# Patient Record
Sex: MALE | Race: WHITE | NOT HISPANIC OR LATINO
[De-identification: names, ages, dates, MRNs, and addresses within clinical notes are randomized per-mention and may not be internally consistent; named-entity substitution may affect disease eponyms.]

---

## 2019-04-20 PROBLEM — Z00.00 ENCOUNTER FOR PREVENTIVE HEALTH EXAMINATION: Status: ACTIVE | Noted: 2019-04-20

## 2019-05-20 ENCOUNTER — APPOINTMENT (OUTPATIENT)
Dept: UROLOGY | Facility: CLINIC | Age: 34
End: 2019-05-20
Payer: COMMERCIAL

## 2019-05-20 VITALS
HEART RATE: 82 BPM | SYSTOLIC BLOOD PRESSURE: 128 MMHG | WEIGHT: 135 LBS | HEIGHT: 68 IN | BODY MASS INDEX: 20.46 KG/M2 | DIASTOLIC BLOOD PRESSURE: 92 MMHG

## 2019-05-20 DIAGNOSIS — Z87.898 PERSONAL HISTORY OF OTHER SPECIFIED CONDITIONS: ICD-10-CM

## 2019-05-20 DIAGNOSIS — F17.210 NICOTINE DEPENDENCE, CIGARETTES, UNCOMPLICATED: ICD-10-CM

## 2019-05-20 DIAGNOSIS — Z78.9 OTHER SPECIFIED HEALTH STATUS: ICD-10-CM

## 2019-05-20 DIAGNOSIS — R10.30 LOWER ABDOMINAL PAIN, UNSPECIFIED: ICD-10-CM

## 2019-05-20 PROCEDURE — 99202 OFFICE O/P NEW SF 15 MIN: CPT

## 2019-05-20 NOTE — ASSESSMENT
[FreeTextEntry1] : His physical examination is within normal limits. His bladder sonogram reveals elevated postvoid residual however, it is likely that he overdistended himself. His physical examination does not reveal prostatitis. He will keep a voiding diary and followup for possible uroflow study.

## 2019-05-20 NOTE — HISTORY OF PRESENT ILLNESS
[Currently Experiencing ___] :  [unfilled] [Straining] : straining [Weak Stream] : weak stream [Intermittency] : intermittency [FreeTextEntry1] : Thiago is a 33-year-old male who was sent for consultation regarding decreased force of urinary stream with urinary hesitancy and intermittent testicular discomfort radiating to his rectum. These symptoms have been intermittent over the past 6 months. He presented to the PCP who ordered a bladder/scrotal ultrasound.\par \par Bladder sonogram reveals pre void volume of 870 and a post void of 290 cc. Prostate by sono is 27 cc and minimally enlarged. He admits that he may have over distended for the study and usually voids better then he did then.\par \par Scrotal ultrasound is essentially within normal limits.\par \par He denies any further irritated/obstructive voiding symptoms, gross hematuria, dysuria, foul-smelling urine, ejaculodynia, abdominal/flank pain, nausea, vomiting, chills, fever, lightheadedness, dizziness,  or further urological/constitutional symptoms.\par

## 2019-05-20 NOTE — PHYSICAL EXAM
[General Appearance - Well Developed] : well developed [General Appearance - Well Nourished] : well nourished [Normal Appearance] : normal appearance [Well Groomed] : well groomed [General Appearance - In No Acute Distress] : no acute distress [Edema] : no peripheral edema [Respiration, Rhythm And Depth] : normal respiratory rhythm and effort [Exaggerated Use Of Accessory Muscles For Inspiration] : no accessory muscle use [Abdomen Soft] : soft [Abdomen Tenderness] : non-tender [Costovertebral Angle Tenderness] : no ~M costovertebral angle tenderness [Normal Station and Gait] : the gait and station were normal for the patient's age [] : no rash [No Focal Deficits] : no focal deficits [Oriented To Time, Place, And Person] : oriented to person, place, and time [Affect] : the affect was normal [Mood] : the mood was normal [Not Anxious] : not anxious [Urethral Meatus] : meatus normal [Penis Abnormality] : normal circumcised penis [Urinary Bladder Findings] : the bladder was normal on palpation [Scrotum] : the scrotum was normal [Epididymis] : the epididymides were normal [Testes Tenderness] : no tenderness of the testes [Anus Abnormality] : the anus and perineum were normal [Rectal Exam - Rectum] : digital rectal exam was normal [Prostate Enlargement] : the prostate was not enlarged [Prostate Tenderness] : the prostate was not tender [No Prostate Nodules] : no prostate nodules [Prostate Size ___ gm] : prostate size [unfilled] gm [Femoral Lymph Nodes Enlarged Bilaterally] : femoral [Inguinal Lymph Nodes Enlarged Bilaterally] : inguinal [Abdomen Hernia] : no hernia was discovered [FreeTextEntry1] : Digital rectal exam reveals 25 g prostate smooth, symmetrical, without fluctuations, pain, or nodules, no evidence of prostatitis. massage did NOT yield EPS

## 2019-05-20 NOTE — LETTER HEADER
[FreeTextEntry3] : Frank Schilling M.D.\par Director of Urology\par [facility]\par [address] [place] [address], [address]\[place] [address]

## 2019-05-20 NOTE — LETTER BODY
[Dear  ___] : Dear  [unfilled], [Please see my note below.] : Please see my note below. [Sincerely,] : Sincerely, [Consult Letter:] : I had the pleasure of evaluating your patient, [unfilled]. [Consult Closing:] : Thank you very much for allowing me to participate in the care of this patient.  If you have any questions, please do not hesitate to contact me. [FreeTextEntry2] : Dr. Yeoman Chan\par [address]\[place] [address]\par

## 2019-05-24 ENCOUNTER — TRANSCRIPTION ENCOUNTER (OUTPATIENT)
Age: 34
End: 2019-05-24

## 2019-07-10 ENCOUNTER — APPOINTMENT (OUTPATIENT)
Dept: UROLOGY | Facility: CLINIC | Age: 34
End: 2019-07-10
Payer: COMMERCIAL

## 2019-07-10 VITALS
HEART RATE: 82 BPM | WEIGHT: 135 LBS | BODY MASS INDEX: 20.46 KG/M2 | DIASTOLIC BLOOD PRESSURE: 92 MMHG | HEIGHT: 68 IN | SYSTOLIC BLOOD PRESSURE: 128 MMHG

## 2019-07-10 DIAGNOSIS — R33.9 RETENTION OF URINE, UNSPECIFIED: ICD-10-CM

## 2019-07-10 DIAGNOSIS — R39.12 POOR URINARY STREAM: ICD-10-CM

## 2019-07-10 PROCEDURE — 99213 OFFICE O/P EST LOW 20 MIN: CPT | Mod: 25

## 2019-07-10 PROCEDURE — 51798 US URINE CAPACITY MEASURE: CPT

## 2019-07-10 PROCEDURE — 99406 BEHAV CHNG SMOKING 3-10 MIN: CPT

## 2019-07-10 PROCEDURE — 51741 ELECTRO-UROFLOWMETRY FIRST: CPT

## 2019-07-10 RX ORDER — ALFUZOSIN HYDROCHLORIDE 10 MG/1
10 TABLET, EXTENDED RELEASE ORAL DAILY
Qty: 30 | Refills: 3 | Status: ACTIVE | COMMUNITY
Start: 2019-07-10 | End: 1900-01-01

## 2019-07-10 NOTE — PHYSICAL EXAM
[General Appearance - Well Developed] : well developed [General Appearance - Well Nourished] : well nourished [Normal Appearance] : normal appearance [Well Groomed] : well groomed [General Appearance - In No Acute Distress] : no acute distress [Edema] : no peripheral edema [Respiration, Rhythm And Depth] : normal respiratory rhythm and effort [] : no respiratory distress [Oriented To Time, Place, And Person] : oriented to person, place, and time [Exaggerated Use Of Accessory Muscles For Inspiration] : no accessory muscle use [Affect] : the affect was normal [Mood] : the mood was normal [Not Anxious] : not anxious [Normal Station and Gait] : the gait and station were normal for the patient's age [No Focal Deficits] : no focal deficits

## 2019-07-10 NOTE — LETTER HEADER
[FreeTextEntry3] : Frank Schilling M.D.\par Director of Urology\par [facility]\par [address] [place] [address], [address]\[place] [address]

## 2019-07-10 NOTE — ASSESSMENT
[FreeTextEntry1] : He is experiencing decreased force of stream and is pushing/straining to void with an elevated post void residual. At this point, we will start him on alfuzosin p.o. q. daily. All usage, dosage, mechanism of action, and adverse events were reviewed. We also discussed Flomax with its decreased force of ejaculation which might help his painful ejaculation versus alfuzosin which can sometimes cause orthostatic hypotension and he chose the alfuzosin. He'll keep a voiding diary in 6 weeks and follow up in 8 weeks for possible flow study and symptom index.\par \par Please note we discussed his smoking which he enjoys but we also discussed the effect it has on once future sexual activity and whether or not smoking might kill him both lung cancer or heart attack of greater concern to him as he might live with impotence. We discussed various methods of stopping and he will try the tapir method going down one cigarette per day limiting himself in the first week to 20 cigarettes. This might take four months but he may be owed to get off it.\par

## 2019-07-10 NOTE — LETTER BODY
DISPLAY PLAN FREE TEXT [Dear  ___] : Dear  [unfilled], [Consult Letter:] : I had the pleasure of evaluating your patient, [unfilled]. [Please see my note below.] : Please see my note below. [Consult Closing:] : Thank you very much for allowing me to participate in the care of this patient.  If you have any questions, please do not hesitate to contact me. [Sincerely,] : Sincerely, [FreeTextEntry2] : Dr. Yeoman Chan\par [address]\[place] [address]\par

## 2019-07-10 NOTE — HISTORY OF PRESENT ILLNESS
[Currently Experiencing ___] :  [unfilled] [Straining] : straining [Weak Stream] : weak stream [Intermittency] : intermittency [FreeTextEntry1] : Thiago is a 33-year-old male who was sent for consultation regarding decreased force of urinary stream with urinary hesitancy and intermittent testicular discomfort radiating to his rectum. These symptoms have been intermittent over the past 6 months. Prior bladder sonogram reveals pre void volume of 870 and a post void of 290 cc. Prostate by trans abdominal sonogram is 27 cc and moderately enlarged. Scrotal ultrasound is essentially within normal limits.\par Physical examination revealed no evidence of prostatitis. Presents today to review his voiding diary and possibly undergo a uroflow study.

## 2019-09-04 ENCOUNTER — APPOINTMENT (OUTPATIENT)
Dept: UROLOGY | Facility: CLINIC | Age: 34
End: 2019-09-04

## 2019-10-02 ENCOUNTER — APPOINTMENT (OUTPATIENT)
Dept: UROLOGY | Facility: CLINIC | Age: 34
End: 2019-10-02

## 2021-08-06 ENCOUNTER — EMERGENCY (EMERGENCY)
Facility: HOSPITAL | Age: 36
LOS: 0 days | Discharge: HOME | End: 2021-08-06
Attending: EMERGENCY MEDICINE | Admitting: EMERGENCY MEDICINE
Payer: MEDICAID

## 2021-08-06 VITALS
SYSTOLIC BLOOD PRESSURE: 117 MMHG | RESPIRATION RATE: 18 BRPM | TEMPERATURE: 97 F | DIASTOLIC BLOOD PRESSURE: 77 MMHG | HEART RATE: 86 BPM | OXYGEN SATURATION: 99 %

## 2021-08-06 DIAGNOSIS — H57.11 OCULAR PAIN, RIGHT EYE: ICD-10-CM
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
## 2021-08-06 DIAGNOSIS — S05.01XA INJURY OF CONJUNCTIVA AND CORNEAL ABRASION WITHOUT FOREIGN BODY, RIGHT EYE, INITIAL ENCOUNTER: ICD-10-CM

## 2021-08-06 DIAGNOSIS — Z23 ENCOUNTER FOR IMMUNIZATION: ICD-10-CM

## 2021-08-06 PROCEDURE — 65220 REMOVE FOREIGN BODY FROM EYE: CPT

## 2021-08-06 PROCEDURE — 99283 EMERGENCY DEPT VISIT LOW MDM: CPT | Mod: 25

## 2021-08-06 RX ORDER — POLYMYXIN B SULF/TRIMETHOPRIM 10000-1/ML
1 DROPS OPHTHALMIC (EYE)
Qty: 1 | Refills: 0
Start: 2021-08-06 | End: 2021-08-12

## 2021-08-06 RX ORDER — TETANUS TOXOID, REDUCED DIPHTHERIA TOXOID AND ACELLULAR PERTUSSIS VACCINE, ADSORBED 5; 2.5; 8; 8; 2.5 [IU]/.5ML; [IU]/.5ML; UG/.5ML; UG/.5ML; UG/.5ML
0.5 SUSPENSION INTRAMUSCULAR ONCE
Refills: 0 | Status: COMPLETED | OUTPATIENT
Start: 2021-08-06 | End: 2021-08-06

## 2021-08-06 RX ADMIN — TETANUS TOXOID, REDUCED DIPHTHERIA TOXOID AND ACELLULAR PERTUSSIS VACCINE, ADSORBED 0.5 MILLILITER(S): 5; 2.5; 8; 8; 2.5 SUSPENSION INTRAMUSCULAR at 03:00

## 2021-08-06 NOTE — ED ADULT TRIAGE NOTE - CHIEF COMPLAINT QUOTE
may have something in right eye may have something in right eye visual acuity left 20/13 right 20/15 both 20/15

## 2021-08-06 NOTE — ED PROVIDER NOTE - OBJECTIVE STATEMENT
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   no palliating/provoking factors.   pulled out a piece of concrete that was in his eye at onset.   no other injuries.   no tearing/vision changes.

## 2021-08-06 NOTE — ED PROVIDER NOTE - PATIENT PORTAL LINK FT
You can access the FollowMyHealth Patient Portal offered by [facility] by registering at the following website: [email or web address]. By joining [facility]’s FollowMyHealth portal, you will also be able to view your health information using other applications (apps) compatible with our system.

## 2021-08-06 NOTE — ED PROVIDER NOTE - NSFOLLOWUPCLINICS_GEN_ALL_ED_FT
[facility]  Ophthalmolgy  [address], [address]  [place] [address]  Phone: [phone]  Fax:   Follow Up Time: 1-3 Days

## 2021-08-06 NOTE — ED PROVIDER NOTE - ATTENDING CONTRIBUTION TO CARE
I personally evaluated the patient. I reviewed the Resident’s or Physician Assistant’s note (as assigned above), and agree with the findings and plan except as documented in my note.     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     ROS otherwise unremarkable    PE: male in no distress. CV: pulses intact. CHEST: normal work of breathing. SKIN: normal. EXT: FROM. NEURO: AAO 3 no focal deficits. HEENT: mucosa normal right eye noted injected sclera with EOMI PERRLA and foreign body at 6:00 position on cornea. No luis a's sign.     Impression: corneal foreign body    Plan: removal, irrigation, outpatient management.

## 2021-08-06 NOTE — ED PROVIDER NOTE - CARE PLAN
Principal Discharge DX:	Foreign body of right eye, initial encounter  Secondary Diagnosis:	Corneal abrasion

## 2021-08-06 NOTE — ED PROVIDER NOTE - PHYSICAL EXAMINATION
PHYSICAL EXAM:    GENERAL: Alert, appears stated age, well appearing, non-toxic  SKIN: Warm, pink and dry. MMM.   HEAD: NC, AT, no step offs   EYE: Normal lids. +R conjunctiva injected, PERRL, EOMI. OU OS OD 20/15. fluorescein stain reveals <1mm R eye foreign body @6'oclock on iris. no rust ring. no other FB/corneal abrasion noted including on flipped lids (I swiped lids). neg sidells.   ENT: Normal hearing, patent oropharynx  NECK: +supple. No meningismus  Pulm: normal resp effort  Mskel: no erythema, cyanosis, edema. no calf tenderness  Neuro: AAOx3, normal gait.

## 2021-08-06 NOTE — ED PROVIDER NOTE - CLINICAL SUMMARY MEDICAL DECISION MAKING FREE TEXT BOX
36 male here for corneal foreign body.  Had removal, will discharge on outpatient antibiotics with return and follow up instructions.

## 2021-08-06 NOTE — ED PROCEDURE NOTE - CPROC ED FOREIGN BODY DETAIL1
flipped and swiped lids, no remaining fb/The area was draped and prepped and the anatomic location of the suspected foreign body was explored in a bloodless field.

## 2021-08-06 NOTE — ED PROVIDER NOTE - PROGRESS NOTE DETAILS
PALEVY: unable to remove FB with irrigation/cotton tipped applicator. on first attempt with bevel up 18g needle, able to remove FB. swiped lids with no remaining fb seen. +corneal abrasion remaining. rpt eye exam with neg sidells. Reviewed necessity for follow up. Counseled on red flags and to return for them.  Patient appears well on discharge. PALEVY: unable to remove FB with irrigation/cotton tipped applicator. on first attempt with bevel up 18g needle, able to remove FB. swiped lids with no remaining fb seen. +corneal abrasion remaining in area of removed foreign body. rpt eye exam with neg sidells. Reviewed necessity for follow up. Counseled on red flags and to return for them.  Patient appears well on discharge.

## 2022-07-21 ENCOUNTER — NON-APPOINTMENT (OUTPATIENT)
Age: 37
End: 2022-07-21

## 2022-07-27 ENCOUNTER — NON-APPOINTMENT (OUTPATIENT)
Age: 37
End: 2022-07-27

## 2023-11-01 ENCOUNTER — NON-APPOINTMENT (OUTPATIENT)
Age: 38
End: 2023-11-01

## 2024-08-18 ENCOUNTER — NON-APPOINTMENT (OUTPATIENT)
Age: 39
End: 2024-08-18

## 2024-10-07 ENCOUNTER — NON-APPOINTMENT (OUTPATIENT)
Age: 39
End: 2024-10-07